# Patient Record
Sex: MALE | Race: WHITE | Employment: OTHER | URBAN - METROPOLITAN AREA
[De-identification: names, ages, dates, MRNs, and addresses within clinical notes are randomized per-mention and may not be internally consistent; named-entity substitution may affect disease eponyms.]

---

## 2017-09-15 PROCEDURE — 87086 URINE CULTURE/COLONY COUNT: CPT | Performed by: NURSE PRACTITIONER

## 2018-12-19 PROCEDURE — 81003 URINALYSIS AUTO W/O SCOPE: CPT | Performed by: INTERNAL MEDICINE

## 2019-01-14 PROBLEM — J30.2 SEASONAL ALLERGIES: Status: ACTIVE | Noted: 2019-01-14

## 2019-01-14 PROBLEM — Z12.11 COLON CANCER SCREENING: Status: ACTIVE | Noted: 2019-01-14

## 2019-01-14 PROBLEM — R10.32 LLQ ABDOMINAL PAIN: Status: ACTIVE | Noted: 2019-01-14

## 2019-02-01 PROCEDURE — 81001 URINALYSIS AUTO W/SCOPE: CPT | Performed by: INTERNAL MEDICINE

## 2019-02-14 PROBLEM — K63.5 HYPERPLASTIC POLYP OF DESCENDING COLON: Status: ACTIVE | Noted: 2019-02-14

## 2019-02-14 PROBLEM — K44.9 HIATAL HERNIA: Status: ACTIVE | Noted: 2019-02-14

## 2020-07-16 PROBLEM — G47.30 SLEEP APNEA: Status: ACTIVE | Noted: 2020-07-16

## 2020-10-13 ENCOUNTER — TELEPHONE (OUTPATIENT)
Dept: SPEECH THERAPY | Facility: HOSPITAL | Age: 50
End: 2020-10-13

## 2020-10-13 ENCOUNTER — ORDER TRANSCRIPTION (OUTPATIENT)
Dept: SPEECH THERAPY | Facility: HOSPITAL | Age: 50
End: 2020-10-13

## 2020-10-13 DIAGNOSIS — J38.3 VOCAL CORD DYSFUNCTION: Primary | ICD-10-CM

## 2020-10-14 ENCOUNTER — OFFICE VISIT (OUTPATIENT)
Dept: SPEECH THERAPY | Facility: HOSPITAL | Age: 50
End: 2020-10-14
Attending: OTOLARYNGOLOGY
Payer: COMMERCIAL

## 2020-10-14 DIAGNOSIS — J38.3 VOCAL CORD DYSFUNCTION: ICD-10-CM

## 2020-10-14 PROCEDURE — 92524 BEHAVRAL QUALIT ANALYS VOICE: CPT

## 2020-10-14 NOTE — PROGRESS NOTES
VOCAL CORD DYSFUNCTION EVALUATION:   Referring Physician: Dr. Angela Sauer  Diagnosis: Vocal cord dysfunction (J38.3)   Date of Service: 10/14/2020   VCD evaluation completed per MD order. Patient arrived to session on time.   He appeared to be in good spirits Asthma    • Mixed hyperlipidemia    • SIMEON (obstructive sleep apnea) 3/9/19-split    AHI 27 RDI 27 Supine AHI 65 non-supine AHI 19 O2 Surendra 83%/CPAP-10cwp/[facility]     Current Medication per Chart:   •  Fluticasone Propionate 50 MCG/ACT Nasal Suspension, INS situations. Patient would benefit from skilled Speech Therapy to address the above impairments to improve his ability to participate fully in his daily activities across environments and sporting events.     Precautions:  None    OBJECTIVE:   Respiration: during rest to prevent/remediate VCD/cough episode given min verbal/visual cues (3 months).   STG 4: Patient will report increased nasal breathing throughout IADLs (eg: laundry, working, walking, cooking, etc.) to prevent/remediate cough episodes (3 months)

## 2020-10-21 ENCOUNTER — OFFICE VISIT (OUTPATIENT)
Dept: SPEECH THERAPY | Facility: HOSPITAL | Age: 50
End: 2020-10-21
Attending: OTOLARYNGOLOGY
Payer: COMMERCIAL

## 2020-10-21 PROCEDURE — 92507 TX SP LANG VOICE COMM INDIV: CPT

## 2020-10-21 NOTE — PROGRESS NOTES
Treatment #2 (HMO; 8 visits approved thru 12/31/20) Treatment Time: 60 minutes  Precautions: none     Charges: 1 billed ([id number])  Pain: 0/10      Diagnosis: Vocal cord dysfunction (J38.3)           Subjective: Patient arrived to session on time.   He appeare Continue speech therapy targeting use of easy/rescue breathing strategies to prevent/remediate VCD episodes.   STG 5: Patient will demonstrate easy/rescue breathing strategies during mild exertion (eg: walking, arm raises, etc.) for 3-5 minutes to prevent/r

## 2020-10-28 ENCOUNTER — OFFICE VISIT (OUTPATIENT)
Dept: SPEECH THERAPY | Facility: HOSPITAL | Age: 50
End: 2020-10-28
Attending: OTOLARYNGOLOGY
Payer: COMMERCIAL

## 2020-10-28 PROCEDURE — 92507 TX SP LANG VOICE COMM INDIV: CPT

## 2020-10-28 NOTE — PROGRESS NOTES
Treatment #3 (HMO; 8 visits approved thru 12/31/20) Treatment Time: 60 minutes  Precautions: none     Charges: 1 billed (00004)  Pain: 0/10      Diagnosis: Vocal cord dysfunction (J38.3)           Subjective: Patient arrived to session on time.   He appeare situations. Plan: Continue speech therapy targeting use of easy/rescue breathing strategies to prevent/remediate VCD episodes.

## 2020-11-02 ENCOUNTER — TELEPHONE (OUTPATIENT)
Dept: SPEECH THERAPY | Facility: HOSPITAL | Age: 50
End: 2020-11-02

## 2020-11-04 ENCOUNTER — APPOINTMENT (OUTPATIENT)
Dept: SPEECH THERAPY | Facility: HOSPITAL | Age: 50
End: 2020-11-04
Attending: OTOLARYNGOLOGY
Payer: COMMERCIAL

## 2020-11-10 ENCOUNTER — TELEPHONE (OUTPATIENT)
Dept: PHYSICAL THERAPY | Facility: HOSPITAL | Age: 50
End: 2020-11-10

## 2020-11-11 ENCOUNTER — APPOINTMENT (OUTPATIENT)
Dept: SPEECH THERAPY | Facility: HOSPITAL | Age: 50
End: 2020-11-11
Attending: OTOLARYNGOLOGY
Payer: COMMERCIAL

## 2020-11-18 ENCOUNTER — APPOINTMENT (OUTPATIENT)
Dept: SPEECH THERAPY | Facility: HOSPITAL | Age: 50
End: 2020-11-18
Attending: OTOLARYNGOLOGY
Payer: COMMERCIAL

## 2020-12-02 PROBLEM — F43.8 OTHER REACTIONS TO SEVERE STRESS: Status: ACTIVE | Noted: 2020-12-02

## 2020-12-02 PROBLEM — F41.1 GENERALIZED ANXIETY DISORDER: Status: ACTIVE | Noted: 2020-12-02

## 2021-03-10 PROBLEM — I10 ESSENTIAL HYPERTENSION: Status: ACTIVE | Noted: 2021-03-10

## 2021-03-10 PROBLEM — Q31.8 VOCAL CORD ANOMALY: Status: ACTIVE | Noted: 2020-05-30

## 2021-06-28 PROBLEM — M75.102 ROTATOR CUFF SYNDROME OF LEFT SHOULDER: Status: ACTIVE | Noted: 2021-06-28

## 2021-07-12 PROBLEM — M75.02 ADHESIVE CAPSULITIS OF LEFT SHOULDER: Status: ACTIVE | Noted: 2021-06-28
